# Patient Record
Sex: MALE | NOT HISPANIC OR LATINO | URBAN - METROPOLITAN AREA
[De-identification: names, ages, dates, MRNs, and addresses within clinical notes are randomized per-mention and may not be internally consistent; named-entity substitution may affect disease eponyms.]

---

## 2023-08-01 ENCOUNTER — OFFICE VISIT (OUTPATIENT)
Dept: URGENT CARE | Facility: URGENT CARE | Age: 43
End: 2023-08-01
Payer: COMMERCIAL

## 2023-08-01 ENCOUNTER — NURSE TRIAGE (OUTPATIENT)
Dept: NURSING | Facility: CLINIC | Age: 43
End: 2023-08-01

## 2023-08-01 ENCOUNTER — ANCILLARY PROCEDURE (OUTPATIENT)
Dept: GENERAL RADIOLOGY | Facility: CLINIC | Age: 43
End: 2023-08-01
Attending: EMERGENCY MEDICINE
Payer: COMMERCIAL

## 2023-08-01 VITALS
TEMPERATURE: 98.6 F | OXYGEN SATURATION: 98 % | WEIGHT: 227 LBS | HEART RATE: 100 BPM | RESPIRATION RATE: 24 BRPM | SYSTOLIC BLOOD PRESSURE: 136 MMHG | DIASTOLIC BLOOD PRESSURE: 93 MMHG

## 2023-08-01 DIAGNOSIS — J40 BRONCHITIS: Primary | ICD-10-CM

## 2023-08-01 DIAGNOSIS — J40 BRONCHITIS: ICD-10-CM

## 2023-08-01 DIAGNOSIS — J06.9 UPPER RESPIRATORY TRACT INFECTION, UNSPECIFIED TYPE: ICD-10-CM

## 2023-08-01 PROCEDURE — 99204 OFFICE O/P NEW MOD 45 MIN: CPT | Performed by: EMERGENCY MEDICINE

## 2023-08-01 PROCEDURE — 71046 X-RAY EXAM CHEST 2 VIEWS: CPT | Mod: TC | Performed by: INTERNAL MEDICINE

## 2023-08-01 RX ORDER — INDOMETHACIN 25 MG/1
CAPSULE ORAL
COMMUNITY
Start: 2023-07-17

## 2023-08-01 RX ORDER — BENZONATATE 200 MG/1
200 CAPSULE ORAL 3 TIMES DAILY PRN
Qty: 25 CAPSULE | Refills: 0 | Status: SHIPPED | OUTPATIENT
Start: 2023-08-01 | End: 2023-08-01

## 2023-08-01 RX ORDER — BENZONATATE 200 MG/1
200 CAPSULE ORAL 3 TIMES DAILY PRN
Qty: 25 CAPSULE | Refills: 0 | Status: SHIPPED | OUTPATIENT
Start: 2023-08-01

## 2023-08-01 NOTE — PROGRESS NOTES
Assessment & Plan     Diagnosis:    ICD-10-CM    1. Bronchitis  J40 XR Chest 2 Views     benzonatate (TESSALON) 200 MG capsule     fluticasone (FLOVENT DISKUS) 100 MCG/ACT inhaler          Medical Decision Making:  Imer Contreras is a 43 year old male who presents for evaluation of cough for over 1 week.       Given duration of symptoms and persistent cough, I did a CXR to eval for pneumonia. This shows no acute infiltrate or effusion on my read, radiology notes as per below. There are no signs of complications of bronchitis at this point such as  hypoxia, respiratory failure or compromise. There is no signs at this point of serious bacterial infection such as OM, RPA, epiglottitis, PTA, sinusitis, meningitis, serious bacterial infection.      There are no  gastrointestinal symptoms at this point and no signs of dehydration.  Close followup with PCP is indicated.  Go to ED for fever > 102 F, protracted vomiting, worsening shortness of breath, chest pain or other concerns.  Patient verbalized understanding and agreement with the plan. Patient was discharged from clinic in stable condition.      Amrit Davis PA-C  [facility]    Subjective     Imer Contreras is a 43 year old male who presents to clinic today for the following health issues:  Chief Complaint   Patient presents with    Cough     X 3-4 days ago, getting worse, had cough meds OTC, can't sleep, no COVID testing FYI, had cold Sx. before the cough       HPI  Patient states that for the last week he has been experiencing a cough, the last 2 days it is a lot worse, especially when laying down at night.  He states he has coughing fits, with talking or breathing deeply, sometimes cannot be stopped.  States that he is not really bringing any phlegm up, not having any chest pain, shortness of breath or other URI symptoms including sore throat, ear pain.  He notes he did have a sore throat initially when this all started, but  this is gone away.  Denies any other symptoms at this time.      Review of Systems    See HPI    Objective      Vitals: BP (!) 136/93 (BP Location: Left arm, Patient Position: Sitting, Cuff Size: Adult Large)   Pulse 100   Temp 98.6  F (37  C) (Tympanic)   Resp 24   Wt 103 kg (227 lb)   SpO2 98%     Patient Vitals for the past 24 hrs:   BP Temp Temp src Pulse Resp SpO2 Weight   08/01/23 1729 (!) 136/93 98.6  F (37  C) Tympanic 100 24 98 % 103 kg (227 lb)       Vital signs reviewed by: Amrit Davis PA-C    Physical Exam   Constitutional: Alert and active. Non-toxic appearing.  No acute distress.  HENT:   Right Ear: External ear normal. TM: normal  Left Ear: External ear normal. TM: normal  Nose: Nose normal.    Eyes: Conjunctivae, EOM and lids are normal.   Mouth: Mucous membranes are moist. Posterior oropharynx is clear. No exudates.   Neck: Normal ROM. No meningismus  Cardiovascular: Regular rate and rhythm  Pulmonary/Chest: Effort normal. No respiratory distress. Lungs are clear to auscultation throughout.  GI: Abdomen is soft and non-tender throughout.   Neurological: Alert and oriented x3.  Skin: No rash noted on visualized skin.       Labs/Imaging:  Results for orders placed or performed in visit on 08/01/23   XR Chest 2 Views     Status: None    Narrative    EXAM: XR CHEST 2 VIEWS  LOCATION: [facility]  DATE: 8/1/2023    INDICATION:  Upper respiratory tract infection, unspecified type  COMPARISON: None.      Impression    IMPRESSION: Negative chest.   Reading per radiology      Amrit Davis PA-C, August 1, 2023

## 2023-08-01 NOTE — TELEPHONE ENCOUNTER
Imer calling state Rx from urgent care sent to wrong pharmacy.                                Meds reordered to [facility] in [place] on [address]  Lorena Rose RN on 8/1/2023 at 6:56 PM    Reason for Disposition   [1] Prescription prescribed recently is not at pharmacy AND [2] triager has access to patient's EMR AND [3] prescription is recorded in the EMR    Protocols used: Medication Question Call-A-AH